# Patient Record
Sex: FEMALE | Race: WHITE | NOT HISPANIC OR LATINO | Employment: FULL TIME | URBAN - METROPOLITAN AREA
[De-identification: names, ages, dates, MRNs, and addresses within clinical notes are randomized per-mention and may not be internally consistent; named-entity substitution may affect disease eponyms.]

---

## 2021-08-17 ENCOUNTER — OFFICE VISIT (OUTPATIENT)
Dept: PULMONOLOGY | Facility: HOSPITAL | Age: 46
End: 2021-08-17

## 2021-08-17 ENCOUNTER — LAB (OUTPATIENT)
Dept: LAB | Facility: HOSPITAL | Age: 46
End: 2021-08-17

## 2021-08-17 VITALS
OXYGEN SATURATION: 95 % | HEIGHT: 68 IN | BODY MASS INDEX: 37.89 KG/M2 | RESPIRATION RATE: 16 BRPM | SYSTOLIC BLOOD PRESSURE: 144 MMHG | DIASTOLIC BLOOD PRESSURE: 85 MMHG | HEART RATE: 107 BPM | WEIGHT: 250 LBS

## 2021-08-17 DIAGNOSIS — J45.30 MILD PERSISTENT ASTHMA, UNSPECIFIED WHETHER COMPLICATED: ICD-10-CM

## 2021-08-17 DIAGNOSIS — G47.33 OSA (OBSTRUCTIVE SLEEP APNEA): ICD-10-CM

## 2021-08-17 DIAGNOSIS — J45.30 MILD PERSISTENT ASTHMA, UNSPECIFIED WHETHER COMPLICATED: Primary | ICD-10-CM

## 2021-08-17 PROBLEM — U09.9 POST-COVID SYNDROME: Status: ACTIVE | Noted: 2020-11-01

## 2021-08-17 PROBLEM — I26.92 SADDLE EMBOLUS OF PULMONARY ARTERY WITHOUT ACUTE COR PULMONALE (HCC): Status: ACTIVE | Noted: 2021-08-17

## 2021-08-17 PROBLEM — E66.01 MORBID OBESITY: Status: ACTIVE | Noted: 2021-08-17

## 2021-08-17 PROBLEM — Z86.711 HISTORY OF PULMONARY EMBOLISM: Status: ACTIVE | Noted: 2021-08-17

## 2021-08-17 PROBLEM — I10 ESSENTIAL HYPERTENSION: Status: ACTIVE | Noted: 2021-08-17

## 2021-08-17 PROBLEM — J45.20 MILD INTERMITTENT ASTHMA WITHOUT COMPLICATION: Status: ACTIVE | Noted: 2021-08-17

## 2021-08-17 LAB
ANISOCYTOSIS BLD QL: ABNORMAL
BASOPHILS # BLD MANUAL: 0.14 10*3/MM3 (ref 0–0.2)
BASOPHILS NFR BLD AUTO: 1 % (ref 0–1.5)
DEPRECATED RDW RBC AUTO: 48.1 FL (ref 37–54)
EOSINOPHIL # BLD MANUAL: 3.64 10*3/MM3 (ref 0–0.4)
EOSINOPHIL NFR BLD MANUAL: 26 % (ref 0.3–6.2)
ERYTHROCYTE [DISTWIDTH] IN BLOOD BY AUTOMATED COUNT: 18.9 % (ref 12.3–15.4)
HCT VFR BLD AUTO: 31.5 % (ref 34–46.6)
HGB BLD-MCNC: 10 G/DL (ref 12–15.9)
LYMPHOCYTES # BLD MANUAL: 1.68 10*3/MM3 (ref 0.7–3.1)
LYMPHOCYTES NFR BLD MANUAL: 12 % (ref 19.6–45.3)
LYMPHOCYTES NFR BLD MANUAL: 4 % (ref 5–12)
MCH RBC QN AUTO: 23.3 PG (ref 26.6–33)
MCHC RBC AUTO-ENTMCNC: 31.8 G/DL (ref 31.5–35.7)
MCV RBC AUTO: 73.3 FL (ref 79–97)
MICROCYTES BLD QL: ABNORMAL
MONOCYTES # BLD AUTO: 0.56 10*3/MM3 (ref 0.1–0.9)
NEUTROPHILS # BLD AUTO: 7.98 10*3/MM3 (ref 1.7–7)
NEUTROPHILS NFR BLD MANUAL: 57 % (ref 42.7–76)
PATHOLOGY REVIEW: YES
PLATELET # BLD AUTO: 402 10*3/MM3 (ref 140–450)
PMV BLD AUTO: 7.4 FL (ref 6–12)
POLYCHROMASIA BLD QL SMEAR: ABNORMAL
RBC # BLD AUTO: 4.29 10*6/MM3 (ref 3.77–5.28)
SCAN SLIDE: NORMAL
SMALL PLATELETS BLD QL SMEAR: ADEQUATE
WBC # BLD AUTO: 14 10*3/MM3 (ref 3.4–10.8)
WBC MORPH BLD: NORMAL

## 2021-08-17 PROCEDURE — 85007 BL SMEAR W/DIFF WBC COUNT: CPT | Performed by: INTERNAL MEDICINE

## 2021-08-17 PROCEDURE — 85025 COMPLETE CBC W/AUTO DIFF WBC: CPT | Performed by: INTERNAL MEDICINE

## 2021-08-17 PROCEDURE — 82785 ASSAY OF IGE: CPT

## 2021-08-17 PROCEDURE — 87449 NOS EACH ORGANISM AG IA: CPT

## 2021-08-17 PROCEDURE — 86606 ASPERGILLUS ANTIBODY: CPT

## 2021-08-17 PROCEDURE — 36415 COLL VENOUS BLD VENIPUNCTURE: CPT

## 2021-08-17 PROCEDURE — G0463 HOSPITAL OUTPT CLINIC VISIT: HCPCS

## 2021-08-17 PROCEDURE — 87385 HISTOPLASMA CAPSUL AG IA: CPT

## 2021-08-17 PROCEDURE — 86003 ALLG SPEC IGE CRUDE XTRC EA: CPT

## 2021-08-17 RX ORDER — BUDESONIDE AND FORMOTEROL FUMARATE DIHYDRATE 160; 4.5 UG/1; UG/1
2 AEROSOL RESPIRATORY (INHALATION) 2 TIMES DAILY
COMMUNITY
Start: 2021-05-12

## 2021-08-17 RX ORDER — OMEPRAZOLE 40 MG/1
40 CAPSULE, DELAYED RELEASE ORAL DAILY
COMMUNITY

## 2021-08-17 RX ORDER — MONTELUKAST SODIUM 10 MG/1
10 TABLET ORAL DAILY PRN
COMMUNITY
Start: 2021-06-14

## 2021-08-17 RX ORDER — APIXABAN 5 MG/1
5 TABLET, FILM COATED ORAL 2 TIMES DAILY
COMMUNITY
Start: 2021-07-23

## 2021-08-17 RX ORDER — AMLODIPINE BESYLATE 5 MG/1
5 TABLET ORAL DAILY
COMMUNITY

## 2021-08-17 RX ORDER — FLUTICASONE PROPIONATE 50 MCG
2 SPRAY, SUSPENSION (ML) NASAL DAILY
COMMUNITY
End: 2021-11-22 | Stop reason: ALTCHOICE

## 2021-08-17 RX ORDER — ARFORMOTEROL TARTRATE 15 UG/2ML
15 SOLUTION RESPIRATORY (INHALATION)
Qty: 120 ML | Refills: 11 | Status: SHIPPED | OUTPATIENT
Start: 2021-08-17

## 2021-08-17 RX ORDER — CETIRIZINE HYDROCHLORIDE 10 MG/1
10 TABLET ORAL DAILY
COMMUNITY

## 2021-08-17 RX ORDER — HYDROCHLOROTHIAZIDE 25 MG/1
25 TABLET ORAL DAILY
COMMUNITY

## 2021-08-17 RX ORDER — BUDESONIDE 0.5 MG/2ML
0.5 INHALANT ORAL
Qty: 60 EACH | Refills: 11 | Status: SHIPPED | OUTPATIENT
Start: 2021-08-17 | End: 2022-09-28 | Stop reason: SDUPTHER

## 2021-08-17 NOTE — PROGRESS NOTES
PULMONARY  CONSULT NOTE      PATIENT IDENTIFICATION:  Name: Tori Thomas  Age: 45 y.o.  Sex: female  :  1975  MRN: [id number]    DATE OF CONSULTATION:  2021                     CHIEF COMPLAINT: Shortness of breath    History of Present Illness:   Tori Thomas is a 45 y.o. female patient with chronic asthma and chronic respiratory symptoms with shortness of breath coughing wheezing improved with steroids but has multiple recurrence she has been seen by allergist and found she has some allergy to dogs and cats, she has dog at home for long time, presented with worsening shortness of breath coughing wheezing more coughing at night sometimes or feeling dizzy after that no significant hemoptysis and feeling postnasal drip  Pt with still multiple wakening up at night with sleepiness fatigue and snoring, witnessed apnea, Hard  to get up in the morning. Daytime fatigue sleepiness loss of energy, Minburn score of (10 )       Review of Systems:   Constitutional:  As above   Eyes: negative   ENT/oropharynx: negative   Cardiovascular: negative   Respiratory:  As above   Gastrointestinal: negative   Genitourinary: negative   Neurological: negative   Musculoskeletal: negative   Integument/breast: negative   Endocrine: negative   Allergic/Immunologic: negative     Past Medical History:  Past Medical History:   Diagnosis Date   • Essential hypertension 2021   • History of pulmonary embolism 2021   • Mild intermittent asthma without complication 2021   • Mild persistent asthma 2021   • Morbid obesity (CMS/HCC) 2021   • ELISEO (obstructive sleep apnea) 2021   • Post-COVID syndrome 2020    [facility]   • Saddle 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 (CMS/HCC) 2021       Past Surgical History:  Past Surgical History:   Procedure Laterality Date   • BARIATRIC SURGERY          Family History:  Family History   Problem Relation Age of Onset   • No Known Problems Mother    •  No Known Problems Father    • Asthma Maternal Grandmother         Social History:   Social History     Tobacco Use   • Smoking status: Never Smoker   • Smokeless tobacco: Never Used   Substance Use Topics   • Alcohol use: Not Currently        Allergies:  Allergies   Allergen Reactions   • Levaquin [Levofloxacin] Unknown - High Severity       Home Meds:  (Not in a hospital admission)      Objective:    Vitals Ranges:   Heart Rate:  [107] 107  Resp:  [16] 16  BP: (144)/(85) 144/85  Body mass index is 38.01 kg/m².     Exam:  General Appearance:  WDWN    HEENT:   without obvious abnormality,  Conjunctiva/corneas clear,  Normal external ear canals, no drainage    Clear orsalmucosa,  Mallampati score 3    Neck:  Supple, symmetrical, trachea midline. No JVD.  Lungs: Diffuse bilateral wheezing and rhonchi bilaterally, respirations unlabored symmetrical wall movement.    Chest wall:  No tenderness or deformity.    Heart:  Regular rate and rhythm, S1 and S2 normal.  Extremities: Trace edema no clubbing or Cyanosis        Data Review:  All labs (24hrs): No results found for this or any previous visit (from the past 24 hour(s)).     Imaging:  No image results found.       ASSESSMENT:  Diagnoses and all orders for this visit:    Mild persistent asthma, unspecified whether complicated  -     Histoplasma Ag Ur - Urine, Clean Catch; Future  -     Blastomyces Antigen - Urine, Urine, Clean Catch; Future  -     Aspergillus Fumigatus IgE; Future  -     Aspergillus Antibodies; Future  -     IgE Level; Future  -     Polysomnography 4 or More Parameters With CPAP; Future    ELISEO (obstructive sleep apnea)    Other orders  -     Eliquis 5 MG tablet tablet; Take 5 mg by mouth 2 (Two) Times a Day.  -     Symbicort 160-4.5 MCG/ACT inhaler; Inhale 2 puffs 2 (Two) Times a Day.  -     montelukast (SINGULAIR) 10 MG tablet; Take 10 mg by mouth Daily As Needed.  -     amLODIPine (NORVASC) 5 MG tablet; Take 5 mg by mouth Daily.  -      hydroCHLOROthiazide (HYDRODIURIL) 25 MG tablet; Take 25 mg by mouth Daily.  -     omeprazole (priLOSEC) 40 MG capsule; Take 40 mg by mouth Daily.  -     sertraline (ZOLOFT) 50 MG tablet; Take 50 mg by mouth Daily.  -     cetirizine (zyrTEC) 10 MG tablet; Take 10 mg by mouth Daily.  -     Discontinue: Fluticasone Furoate 50 MCG/ACT aerosol powder ; Inhale.  -     fluticasone (FLONASE) 50 MCG/ACT nasal spray; 2 sprays into the nostril(s) as directed by provider Daily.  -     CBC & Differential; Standing  -     CBC & Differential  -     budesonide (Pulmicort) 0.5 MG/2ML nebulizer solution; Take 2 mL by nebulization Daily. DX J44.9  -     arformoterol (Brovana) 15 MCG/2ML nebulizer solution; Take 2 mL by nebulization 2 (Two) Times a Day.    PE    PLAN:  Blood and urine work for fungal serology and IgE level    Bronchodilator inhaled corticosteroid add Pulmicort nebulizer and Brovana, because inhaler is not functioning    Education how to use inhalers    Encouraged to use incentive spirometer    Continue to exercise slowly as tolerated    Monitor for any change in the color of the sputum    Avoid any exposure to fumes, gas or any irritant      This is patient with symptoms of obstructive sleep apnea, NPSG study ASAP / split night study, Avoid supine avoid sedative meds in pm, weight loss, Avoid driving. Long discussion with patient about the physiology of ELISEO, and long term and short term   benefit of treating ELISEO      Continue anticoagulation for PE    Follow-up 2 weeks    Leilani Velasquez MD. D, ABSM.  8/17/2021  15:34 EDT

## 2021-08-18 LAB
LAB AP CASE REPORT: NORMAL
PATH REPORT.FINAL DX SPEC: NORMAL

## 2021-08-19 ENCOUNTER — TELEPHONE (OUTPATIENT)
Dept: PULMONOLOGY | Facility: HOSPITAL | Age: 46
End: 2021-08-19

## 2021-08-19 NOTE — TELEPHONE ENCOUNTER
Pt called and stated that Dr. Velasquez wanted an update on pts s/s. She thinks lung capacity is better but still wheezing, coughing and has a lot of drainage. Please advise further.

## 2021-08-20 LAB
A FLAVUS AB SER QL ID: NEGATIVE
A FUMIGATUS AB SER QL ID: NEGATIVE
A FUMIGATUS IGE QN: 0.29 KU/L
A NIGER AB SER QL ID: NEGATIVE
H CAPSUL AG UR QL IA: <0.5
IGE SERPL-ACNC: 1595 IU/ML (ref 6–495)
Lab: NORMAL
MVISTA(R) BLASTOMYCES AG: NORMAL NG/ML
SPECIMEN SOURCE: NORMAL

## 2021-08-20 NOTE — TELEPHONE ENCOUNTER
Per Dr. Velasquez, pt is to continue the Duo~nebs for now no abx or steroids at this time. S/W pt and she verbalized understanding.

## 2021-11-22 ENCOUNTER — OFFICE VISIT (OUTPATIENT)
Dept: PULMONOLOGY | Facility: HOSPITAL | Age: 46
End: 2021-11-22

## 2021-11-22 VITALS
OXYGEN SATURATION: 97 % | RESPIRATION RATE: 14 BRPM | SYSTOLIC BLOOD PRESSURE: 137 MMHG | BODY MASS INDEX: 44.41 KG/M2 | DIASTOLIC BLOOD PRESSURE: 80 MMHG | HEART RATE: 100 BPM | WEIGHT: 293 LBS | HEIGHT: 68 IN

## 2021-11-22 DIAGNOSIS — G47.33 OSA (OBSTRUCTIVE SLEEP APNEA): ICD-10-CM

## 2021-11-22 DIAGNOSIS — J45.20 MILD INTERMITTENT ASTHMA WITHOUT COMPLICATION: Primary | ICD-10-CM

## 2021-11-22 DIAGNOSIS — I10 ESSENTIAL HYPERTENSION: ICD-10-CM

## 2021-11-22 DIAGNOSIS — J30.9 ALLERGIC RHINITIS, UNSPECIFIED SEASONALITY, UNSPECIFIED TRIGGER: ICD-10-CM

## 2021-11-22 PROBLEM — J34.3 HYPERTROPHY OF NASAL TURBINATES: Status: ACTIVE | Noted: 2018-10-04

## 2021-11-22 PROCEDURE — G0463 HOSPITAL OUTPT CLINIC VISIT: HCPCS

## 2021-11-22 RX ORDER — BENRALIZUMAB 30 MG/ML
INJECTION, SOLUTION SUBCUTANEOUS
COMMUNITY
Start: 2021-06-07 | End: 2021-11-22

## 2021-11-22 RX ORDER — COLISTIMETHATE SODIUM 150 MG/1
INJECTION, POWDER, LYOPHILIZED, FOR SOLUTION INTRAMUSCULAR; INTRAVENOUS
COMMUNITY

## 2021-11-22 RX ORDER — VORICONAZOLE 200 MG/1
200 TABLET, FILM COATED ORAL DAILY
COMMUNITY
Start: 2021-09-28

## 2021-11-22 RX ORDER — FLUTICASONE PROPIONATE AND SALMETEROL XINAFOATE 115; 21 UG/1; UG/1
AEROSOL, METERED RESPIRATORY (INHALATION)
COMMUNITY
Start: 2021-10-27

## 2021-11-22 RX ORDER — DOXYCYCLINE HYCLATE 100 MG
100 TABLET ORAL 2 TIMES DAILY
Qty: 20 TABLET | Refills: 1 | Status: SHIPPED | OUTPATIENT
Start: 2021-11-22 | End: 2021-12-20

## 2021-11-22 RX ORDER — DEXAMETHASONE 4 MG/1
2 TABLET ORAL 2 TIMES DAILY
COMMUNITY
Start: 2021-09-07

## 2021-11-22 RX ORDER — CODEINE PHOSPHATE AND GUAIFENESIN 10; 100 MG/5ML; MG/5ML
SOLUTION ORAL
COMMUNITY
Start: 2021-11-03

## 2021-11-22 RX ORDER — TRIAMCINOLONE ACETONIDE 1 MG/G
CREAM TOPICAL
COMMUNITY
Start: 2021-10-06

## 2021-11-22 RX ORDER — ALBUTEROL SULFATE 2.5 MG/3ML
SOLUTION RESPIRATORY (INHALATION) EVERY 6 HOURS PRN
COMMUNITY
Start: 2021-11-03

## 2021-11-22 NOTE — PROGRESS NOTES
SLEEP/PULMONARY  CLINIC NOTE      PATIENT IDENTIFICATION:  Name: Tori Thomas  Age: 45 y.o.  Sex: female  :  1975  MRN: [id number]    DATE OF CONSULTATION:  2021                     CHIEF COMPLAINT: Follow-up on shortness of breath    History of Present Illness:   Tori Thomas is a 45 y.o. female patient with persistent asthma over the last few weeks she is feeling significantly better less shortness of breath less coughing but she has more sinus congestion and drainage  Labs result was discussed with her the IgE was high and she was seen by allergist and he did not recommend any treatment now  Her Aspergillus antigen was positive but now she is asymptomatic      Review of Systems:   Constitutional:  As above   Eyes: negative   ENT/oropharynx:  As above   Cardiovascular: negative   Respiratory: negative   Gastrointestinal: negative   Genitourinary: negative   Neurological: negative   Musculoskeletal: negative   Integument/breast: negative   Endocrine: negative   Allergic/Immunologic: negative     Past Medical History:  Past Medical History:   Diagnosis Date   • Essential hypertension 2021   • History of pulmonary embolism 2021   • Mild intermittent asthma without complication 2021   • Mild persistent asthma 2021   • Morbid obesity (HCC) 2021   • ELISEO (obstructive sleep apnea) 2021   • Post-COVID syndrome 2020    [facility]   • Saddle 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 (HCC) 2021       Past Surgical History:  Past Surgical History:   Procedure Laterality Date   • BARIATRIC SURGERY          Family History:  Family History   Problem Relation Age of Onset   • No Known Problems Mother    • No Known Problems Father    • Asthma Maternal Grandmother         Social History:   Social History     Tobacco Use   • Smoking status: Never Smoker   • Smokeless tobacco: Never Used   Substance Use Topics   • Alcohol use: Not Currently        Allergies:  Allergies    Allergen Reactions   • Levaquin [Levofloxacin] Unknown - High Severity       Home Meds:  (Not in a hospital admission)      Objective:    Vitals Ranges:   Heart Rate:  [100] 100  Resp:  [14] 14  BP: (137)/(80) 137/80  Body mass index is 45.61 kg/m².     Exam:  General Appearance:  WDWN    HEENT:   without obvious abnormality,  Conjunctiva/corneas clear,  Normal external ear canals, no drainage    Clear orsalmucosa,  Mallampati score 3    Neck:  Supple, symmetrical, trachea midline. No JVD.  Lungs:   Bilateral basal rhonchi bilaterally, respirations unlabored symmetrical wall movement.    Chest wall:  No tenderness or deformity.    Heart:  Regular rate and rhythm, S1 and S2 normal.  Extremities: Trace edema no clubbing or Cyanosis        Data Review:  All labs (24hrs): No results found for this or any previous visit (from the past 24 hour(s)).     Imaging:  No image results found.       ASSESSMENT:  Diagnoses and all orders for this visit:    Mild intermittent asthma without complication    Allergic rhinitis, unspecified seasonality, unspecified trigger    ELISEO (obstructive sleep apnea)    Essential hypertension    Other orders  -     albuterol (PROVENTIL) (2.5 MG/3ML) 0.083% nebulizer solution; Take  by nebulization Every 6 (Six) Hours As Needed.  -     Colistimethate Sodium, CBA, (COLYMYCIN) 150 MG injection; colistin (colistimethate sodium) 150 mg solution for injection  -     Advair -21 MCG/ACT inhaler; INHALE 2 PUFFS EVERY MORNING AND EVERY EVENING  -     guaiFENesin-codeine (ROMILAR-AC) 100-10 MG/5ML syrup; TAKE 10 MLS BY MOUTH EVERY 4 HOURS AS NEEDED  -     triamcinolone (KENALOG) 0.1 % cream; APPLY TO RASH TWICE DAILY  -     voriconazole (VFEND) 200 MG tablet; Take 200 mg by mouth Daily.  -     Flovent  MCG/ACT inhaler; Inhale 2 puffs 2 (Two) Times a Day.  -     Discontinue: Benralizumab (Fasenra Pen) 30 MG/ML solution auto-injector; Inject  under the skin into the appropriate area as  directed.  -     doxycycline (VIBRAMYICN) 100 MG tablet; Take 1 tablet by mouth 2 (Two) Times a Day.  -     budesonide (Rhinocort Allergy) 32 MCG/ACT nasal spray; 2 sprays into the nostril(s) as directed by provider Daily.    Allergic bronchopulmonary aspergillosis    PLAN:  Patient clinically is improving her asthma, her IgE very high we are going to explore the treatment for Xolair if she continued to have recurrent symptoms  Regard to her allergic rhinitis and acute sinusitis and will start her on doxycycline course    This is patient with symptoms of obstructive sleep apnea, NPSG study ASAP / split night study, Avoid supine avoid sedative meds in pm, weight loss, Avoid driving. Long discussion with patient about the physiology of ELISEO, and long term and short term   benefit of treating ELISEO     Treating ELISEO will improve BP control    Record her PE we are going to discontinue anticoagulation after finishing this prescription    Follow-up 3 months    Leilani Velasquez MD. D, ABSM.  11/22/2021  15:20 EST

## 2021-11-29 PROBLEM — J34.89 STUFFY AND RUNNY NOSE: Status: ACTIVE | Noted: 2021-11-01

## 2021-12-09 ENCOUNTER — TELEPHONE (OUTPATIENT)
Dept: PULMONOLOGY | Facility: HOSPITAL | Age: 46
End: 2021-12-09

## 2021-12-09 DIAGNOSIS — J45.20 MILD INTERMITTENT ASTHMA, UNSPECIFIED WHETHER COMPLICATED: Primary | ICD-10-CM

## 2021-12-09 NOTE — TELEPHONE ENCOUNTER
Pt called to let us know that her cough has returned. At LOV Melissa asked pt to call back if cough returns. She was told she needed to have bronch scheduled and she is ready to schedule now. Please advise?

## 2021-12-10 NOTE — TELEPHONE ENCOUNTER
Per Dr. Velasquez he wants pt to get labs done before proceeding with a Bronch. I s/w pt and she will get them done Saturday 12/11/21

## 2021-12-11 ENCOUNTER — LAB (OUTPATIENT)
Dept: LAB | Facility: HOSPITAL | Age: 46
End: 2021-12-11

## 2021-12-11 DIAGNOSIS — J45.20 MILD INTERMITTENT ASTHMA, UNSPECIFIED WHETHER COMPLICATED: ICD-10-CM

## 2021-12-11 PROCEDURE — 36415 COLL VENOUS BLD VENIPUNCTURE: CPT

## 2021-12-11 PROCEDURE — 82785 ASSAY OF IGE: CPT

## 2021-12-11 PROCEDURE — 86003 ALLG SPEC IGE CRUDE XTRC EA: CPT

## 2021-12-14 NOTE — TELEPHONE ENCOUNTER
Pt called asking when Dr. Velasquez would like to schedule scope. Let her know that we are waiting on results from the labs and will call her once he has seen the lab results.

## 2021-12-15 LAB
A FUMIGATUS IGE QN: 0.17 KU/L
IGE SERPL-ACNC: 598 IU/ML (ref 6–495)

## 2021-12-16 NOTE — TELEPHONE ENCOUNTER
Pt had abnormal IgE and Aspergillus labs. Will discuss further with MD. S/W Dr. Velasquez and he wants to see pt on Monday

## 2021-12-20 ENCOUNTER — OFFICE VISIT (OUTPATIENT)
Dept: PULMONOLOGY | Facility: HOSPITAL | Age: 46
End: 2021-12-20

## 2021-12-20 VITALS
BODY MASS INDEX: 44.41 KG/M2 | HEART RATE: 95 BPM | OXYGEN SATURATION: 93 % | HEIGHT: 68 IN | DIASTOLIC BLOOD PRESSURE: 70 MMHG | SYSTOLIC BLOOD PRESSURE: 142 MMHG | RESPIRATION RATE: 16 BRPM | WEIGHT: 293 LBS

## 2021-12-20 DIAGNOSIS — J45.20 MILD INTERMITTENT ASTHMA WITHOUT COMPLICATION: ICD-10-CM

## 2021-12-20 DIAGNOSIS — B44.81 ABPA (ALLERGIC BRONCHOPULMONARY ASPERGILLOSIS) (HCC): Primary | ICD-10-CM

## 2021-12-20 DIAGNOSIS — G47.33 OSA (OBSTRUCTIVE SLEEP APNEA): ICD-10-CM

## 2021-12-20 DIAGNOSIS — I10 ESSENTIAL HYPERTENSION: ICD-10-CM

## 2021-12-20 PROCEDURE — G0463 HOSPITAL OUTPT CLINIC VISIT: HCPCS

## 2021-12-20 RX ORDER — BUDESONIDE, GLYCOPYRROLATE, AND FORMOTEROL FUMARATE 160; 9; 4.8 UG/1; UG/1; UG/1
2 AEROSOL, METERED RESPIRATORY (INHALATION) 2 TIMES DAILY
Qty: 1 EACH | Refills: 10 | Status: SHIPPED | OUTPATIENT
Start: 2021-12-20

## 2021-12-20 RX ORDER — ITRACONAZOLE 100 MG/1
200 CAPSULE ORAL DAILY
Qty: 30 CAPSULE | Refills: 5 | Status: SHIPPED | OUTPATIENT
Start: 2021-12-20

## 2021-12-20 NOTE — PROGRESS NOTES
SLEEP/PULMONARY  CLINIC NOTE      PATIENT IDENTIFICATION:  Name: Tori Thomas  Age: 46 y.o.  Sex: female  :  1975  MRN: [id number]    DATE OF CONSULTATION:  2021                     CHIEF COMPLAINT: Cough    History of Present Illness:   Tori Thomas is a 46 y.o. female patient was feeling significantly better after last visit, but over the last few weeks start having more coughing and more shortness of breath and feeling mucus in the upper part of the chest unable to bring it up, no fever no chills no dizziness no lightheadedness  Patient developed diffuse skin rash erythematous and she has keep itching it in her upper extremities thighs and abdomen      Review of Systems:   Constitutional:  As above   Eyes: negative   ENT/oropharynx: negative   Cardiovascular: negative   Respiratory:  As above   Gastrointestinal: negative   Genitourinary: negative   Neurological: negative   Musculoskeletal: negative   Integument/breast: negative   Endocrine: negative   Allergic/Immunologic: negative     Past Medical History:  Past Medical History:   Diagnosis Date   • Essential hypertension 2021   • History of pulmonary embolism 2021   • Mild intermittent asthma without complication 2021   • Mild persistent asthma 2021   • Morbid obesity (HCC) 2021   • ELISEO (obstructive sleep apnea) 2021   • Post-COVID syndrome 2020    [facility]   • Saddle 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 (HCC) 2021       Past Surgical History:  Past Surgical History:   Procedure Laterality Date   • BARIATRIC SURGERY          Family History:  Family History   Problem Relation Age of Onset   • No Known Problems Mother    • No Known Problems Father    • Asthma Maternal Grandmother         Social History:   Social History     Tobacco Use   • Smoking status: Never Smoker   • Smokeless tobacco: Never Used   Substance Use Topics   • Alcohol use: Not Currently        Allergies:  Allergies    Allergen Reactions   • Levaquin [Levofloxacin] Unknown - High Severity       Home Meds:  (Not in a hospital admission)      Objective:    Vitals Ranges:   Heart Rate:  [95] 95  Resp:  [16] 16  BP: (142)/(70) 142/70  Body mass index is 45.61 kg/m².     Exam:  General Appearance:  WDWN    HEENT:   without obvious abnormality,  Conjunctiva/corneas clear,  Normal external ear canals, no drainage    Clear orsalmucosa,  Mallampati score 3    Neck:  Supple, symmetrical, trachea midline. No JVD.  Lungs:   Bilateral basal rhonchi bilaterally, respirations unlabored symmetrical wall movement.    Chest wall:  No tenderness or deformity.    Heart:  Regular rate and rhythm, S1 and S2 normal.  Extremities: Trace edema no clubbing or Cyanosis        Data Review:  All labs (24hrs): No results found for this or any previous visit (from the past 24 hour(s)).     Imaging:  No image results found.       ASSESSMENT:  Diagnoses and all orders for this visit:    ABPA (allergic bronchopulmonary aspergillosis) ([facility])    Mild intermittent asthma without complication    ELISEO (obstructive sleep apnea)    Essential hypertension    Other orders  -     itraconazole (Sporanox) 100 MG capsule; Take 2 capsules by mouth Daily.  -     Budeson-Glycopyrrol-Formoterol (Breztri Aerosphere) 160-9-4.8 MCG/ACT aerosol inhaler; Inhale 2 puffs 2 (Two) Times a Day.        PLAN:  Results of the labs was discussed with the patient and plan to add itraconazole for now, from the current exam I do not feel we have to start oral steroid      Bronchodilator inhaled corticosteroid Breztri    Education how to use inhalers    Encouraged to use incentive spirometer    Continue to exercise slowly as tolerated    Monitor for any change in the color of the sputum    Avoid any exposure to fumes, gas or any irritant      This is patient with symptoms of obstructive sleep apnea, NPSG study ASAP / split night study, Avoid supine avoid sedative meds in pm, weight loss, Avoid  driving. Long discussion with patient about the physiology of ELISEO, and long term and short term   benefit of treating ELISEO    Follow-up 3 months    Leilani Velasquez MD. D, ABSM.  12/20/2021  14:34 EST

## 2022-01-04 ENCOUNTER — TELEPHONE (OUTPATIENT)
Dept: PULMONOLOGY | Facility: HOSPITAL | Age: 47
End: 2022-01-04

## 2022-01-04 NOTE — TELEPHONE ENCOUNTER
Patient states that she is still coughing bad and is not sleeping at night. She just finished her antifungal medication for 2 weeks (intraconazole). She is asking if it will clear her drainage. If not, will she need an antibiotic?     Please advise.

## 2022-01-18 NOTE — TELEPHONE ENCOUNTER
Per Dr Velasquez, that medication will only clear her lungs. I left a message for the patient to reach out to her PCP if she is still having drainage issues.

## 2022-09-28 DIAGNOSIS — B44.81 ABPA (ALLERGIC BRONCHOPULMONARY ASPERGILLOSIS): Primary | ICD-10-CM

## 2022-09-28 RX ORDER — BUDESONIDE 0.5 MG/2ML
0.5 INHALANT ORAL
Qty: 60 ML | Refills: 0 | Status: SHIPPED | OUTPATIENT
Start: 2022-09-28

## 2025-02-04 ENCOUNTER — APPOINTMENT (OUTPATIENT)
Dept: WOMENS IMAGING | Facility: HOSPITAL | Age: 50
End: 2025-02-04
Payer: COMMERCIAL

## 2025-02-04 PROCEDURE — G0279 TOMOSYNTHESIS, MAMMO: HCPCS | Performed by: RADIOLOGY

## 2025-02-04 PROCEDURE — 77065 DX MAMMO INCL CAD UNI: CPT | Performed by: RADIOLOGY

## 2025-02-04 PROCEDURE — 77061 BREAST TOMOSYNTHESIS UNI: CPT | Performed by: RADIOLOGY
